# Patient Record
(demographics unavailable — no encounter records)

---

## 2017-04-11 NOTE — ED
General Adult HPI





- General


Source: patient, RN notes reviewed


Mode of arrival: wheelchair


Limitations: no limitations





<Pierre Espino - Last Filed: 04/11/17 19:40>





<Eb Vicente - Last Filed: 04/11/17 22:48>





- General


Chief complaint: Abdominal Pain


Stated complaint: Abdominal pain


Time Seen by Provider: 04/11/17 19:32





- History of Present Illness


Initial comments: 





Patient is a pleasant 54-year-old female presenting to the emergency department 

complaining of abdominal discomfort.  Onset was around 2:00.  Symptoms have 

improved and are now mild.  Patient does have discomfort right side of the 

abdomen with radiation towards the back.  Patient has had similar back pain 

multiple times in the past.  Patient also at times feels like her food is not 

being swallowed appropriately and vomits it up.  Patient having problems with 

swallowing feels discomfort more in the mid to lower abdomen.  No fevers.  No 

food prior to onset of symptoms today. (Pierre Espino)





- Related Data


 Previous Rx's











 Medication  Instructions  Recorded


 


Dicyclomine [Bentyl] 20 mg PO QID #15 tablet 04/11/17


 


Famotidine [Pepcid] 20 mg PO DAILY #14 tablet 04/11/17











 Allergies











Allergy/AdvReac Type Severity Reaction Status Date / Time


 


erythromycin base Allergy  Nausea & Verified 04/11/17 19:43





[Erythromycin Base]   Vomiting &  





   Diarrhea  


 


Penicillins Allergy  Rash/Hives Verified 04/11/17 19:43














Review of Systems


ROS Other: All systems not noted in ROS Statement are negative.


Constitutional: Denies: fever


Eyes: Denies: eye pain


ENT: Denies: ear pain


Respiratory: Denies: cough


Cardiovascular: Denies: chest pain


Endocrine: Denies: fatigue


Gastrointestinal: Reports: abdominal pain.  Denies: vomiting


Genitourinary: Denies: dysuria


Musculoskeletal: Denies: arthralgia


Skin: Denies: rash


Neurological: Denies: weakness





<Pierre Espino - Last Filed: 04/11/17 19:40>


ROS Other: All systems not noted in ROS Statement are negative.





<Eb Vicente - Last Filed: 04/11/17 22:48>


ROS Statement: 


Those systems with pertinent positive or pertinent negative responses have been 

documented in the HPI.








Past Medical History


Past Medical History: No Reported History


History of Any Multi-Drug Resistant Organisms: None Reported


Past Surgical History: Hernia Repair, Tubal Ligation


Past Psychological History: No Psychological Hx Reported


Smoking Status: Never smoker


Past Alcohol Use History: None Reported


Past Drug Use History: None Reported





<Pierre Espino - Last Filed: 04/11/17 19:40>





General Exam


Limitations: no limitations


General appearance: alert, in no apparent distress


Head exam: Present: atraumatic


Eye exam: Present: normal appearance, PERRL


ENT exam: Present: normal oropharynx


Neck exam: Present: normal inspection


Respiratory exam: Present: normal lung sounds bilaterally


Cardiovascular Exam: Present: regular rate, normal rhythm


GI/Abdominal exam: Present: soft, tenderness (Mild tenderness in the epigastric 

and upper quadrant.  Moderate tenderness in the mid and right lower quadrant.), 

normal bowel sounds.  Absent: distended, guarding, rebound, rigid, pulsatile 

mass


Extremities exam: Present: normal inspection.  Absent: pedal edema, calf 

tenderness


Back exam: Present: normal inspection.  Absent: tenderness


Neurological exam: Present: alert


Psychiatric exam: Present: normal affect, normal mood


Skin exam: Absent: rash





<Pierre Espino - Last Filed: 04/11/17 19:40>





Medical Decision Making





- Lab Data


Result diagrams: 


 04/11/17 20:05





 04/11/17 20:05





<Eb Vicente - Last Filed: 04/11/17 22:48>





- Lab Data





 Lab Results











  04/11/17 04/11/17 04/11/17 Range/Units





  20:05 20:05 20:05 


 


WBC   11.0 H   (3.8-10.6)  k/uL


 


RBC   4.46   (3.80-5.40)  m/uL


 


Hgb   14.3   (11.4-16.0)  gm/dL


 


Hct   40.5   (34.0-46.0)  %


 


MCV   90.8   (80.0-100.0)  fL


 


MCH   32.1   (25.0-35.0)  pg


 


MCHC   35.3   (31.0-37.0)  g/dL


 


RDW   12.2   (11.5-15.5)  %


 


Plt Count   349   (150-450)  k/uL


 


Neutrophils %   76   %


 


Lymphocytes %   15   %


 


Monocytes %   5   %


 


Eosinophils %   2   %


 


Basophils %   0   %


 


Neutrophils #   8.4 H   (1.3-7.7)  k/uL


 


Lymphocytes #   1.7   (1.0-4.8)  k/uL


 


Monocytes #   0.5   (0-1.0)  k/uL


 


Eosinophils #   0.2   (0-0.7)  k/uL


 


Basophils #   0.0   (0-0.2)  k/uL


 


PT    10.7  (9.0-12.0)  sec


 


INR    1.1  (<1.1)  


 


APTT    23.9  (22.0-30.0)  sec


 


Sodium  139    (137-145)  mmol/L


 


Potassium  4.1    (3.5-5.1)  mmol/L


 


Chloride  102    ()  mmol/L


 


Carbon Dioxide  27    (22-30)  mmol/L


 


Anion Gap  10    mmol/L


 


BUN  15    (7-17)  mg/dL


 


Creatinine  0.72    (0.52-1.04)  mg/dL


 


Est GFR (MDRD) Af Amer  >60    (>60 ml/min/1.73 sqM)  


 


Est GFR (MDRD) Non-Af  >60    (>60 ml/min/1.73 sqM)  


 


Glucose  103 H    (74-99)  mg/dL


 


Calcium  9.5    (8.4-10.2)  mg/dL


 


Total Bilirubin  0.8    (0.2-1.3)  mg/dL


 


AST  20    (14-36)  U/L


 


ALT  22    (9-52)  U/L


 


Alkaline Phosphatase  58    ()  U/L


 


Total Protein  7.8    (6.3-8.2)  g/dL


 


Albumin  4.3    (3.5-5.0)  g/dL


 


Amylase  62    ()  U/L


 


Lipase  160    ()  U/L


 


Urine Color     


 


Urine Appearance     (Clear)  


 


Urine pH     (5.0-8.0)  


 


Ur Specific Gravity     (1.001-1.035)  


 


Urine Protein     (Negative)  


 


Urine Glucose (UA)     (Negative)  


 


Urine Ketones     (Negative)  


 


Urine Blood     (Negative)  


 


Urine Nitrite     (Negative)  


 


Urine Bilirubin     (Negative)  


 


Urine Urobilinogen     (<2.0)  mg/dL


 


Ur Leukocyte Esterase     (Negative)  


 


Urine WBC     (0-5)  /hpf


 


Ur Squamous Epith Cells     (0-4)  /hpf


 


Amorphous Sediment     (None)  /hpf


 


Urine Mucus     (None)  /hpf














  04/11/17 Range/Units





  20:05 


 


WBC   (3.8-10.6)  k/uL


 


RBC   (3.80-5.40)  m/uL


 


Hgb   (11.4-16.0)  gm/dL


 


Hct   (34.0-46.0)  %


 


MCV   (80.0-100.0)  fL


 


MCH   (25.0-35.0)  pg


 


MCHC   (31.0-37.0)  g/dL


 


RDW   (11.5-15.5)  %


 


Plt Count   (150-450)  k/uL


 


Neutrophils %   %


 


Lymphocytes %   %


 


Monocytes %   %


 


Eosinophils %   %


 


Basophils %   %


 


Neutrophils #   (1.3-7.7)  k/uL


 


Lymphocytes #   (1.0-4.8)  k/uL


 


Monocytes #   (0-1.0)  k/uL


 


Eosinophils #   (0-0.7)  k/uL


 


Basophils #   (0-0.2)  k/uL


 


PT   (9.0-12.0)  sec


 


INR   (<1.1)  


 


APTT   (22.0-30.0)  sec


 


Sodium   (137-145)  mmol/L


 


Potassium   (3.5-5.1)  mmol/L


 


Chloride   ()  mmol/L


 


Carbon Dioxide   (22-30)  mmol/L


 


Anion Gap   mmol/L


 


BUN   (7-17)  mg/dL


 


Creatinine   (0.52-1.04)  mg/dL


 


Est GFR (MDRD) Af Amer   (>60 ml/min/1.73 sqM)  


 


Est GFR (MDRD) Non-Af   (>60 ml/min/1.73 sqM)  


 


Glucose   (74-99)  mg/dL


 


Calcium   (8.4-10.2)  mg/dL


 


Total Bilirubin   (0.2-1.3)  mg/dL


 


AST   (14-36)  U/L


 


ALT   (9-52)  U/L


 


Alkaline Phosphatase   ()  U/L


 


Total Protein   (6.3-8.2)  g/dL


 


Albumin   (3.5-5.0)  g/dL


 


Amylase   ()  U/L


 


Lipase   ()  U/L


 


Urine Color  Yellow  


 


Urine Appearance  Cloudy H  (Clear)  


 


Urine pH  7.5  (5.0-8.0)  


 


Ur Specific Gravity  1.020  (1.001-1.035)  


 


Urine Protein  Trace H  (Negative)  


 


Urine Glucose (UA)  Negative  (Negative)  


 


Urine Ketones  Negative  (Negative)  


 


Urine Blood  Negative  (Negative)  


 


Urine Nitrite  Negative  (Negative)  


 


Urine Bilirubin  Negative  (Negative)  


 


Urine Urobilinogen  <2.0  (<2.0)  mg/dL


 


Ur Leukocyte Esterase  Negative  (Negative)  


 


Urine WBC  1  (0-5)  /hpf


 


Ur Squamous Epith Cells  3  (0-4)  /hpf


 


Amorphous Sediment  Few H  (None)  /hpf


 


Urine Mucus  Occasional H  (None)  /hpf














Disposition





<Pierre Espino - Last Filed: 04/11/17 19:40>





<Eb Vicente - Last Filed: 04/11/17 22:48>


Clinical Impression: 


 Abdominal pain





Disposition: HOME SELF-CARE


Condition: Good


Instructions:  Abdominal Pain (ED)


Prescriptions: 


Dicyclomine [Bentyl] 20 mg PO QID #15 tablet


Famotidine [Pepcid] 20 mg PO DAILY #14 tablet


Referrals: 


Carlos Sherwood MD [Primary Care Provider] - 1-2 days

## 2017-04-11 NOTE — CT
EXAMINATION TYPE: CT ABDOMEN PELVIS WO CON

 

DATE OF EXAM: 4/11/2017 8:22 PM

 

COMPARISON: NONE

 

HISTORY: abdominal pain

 

CT DLP: 570.7 mGycm. Automated exposure control for dose reduction was used.

 

TECHNIQUE:  Helical acquisition of images was performed from the lung bases through the pelvis.

 

FINDINGS: 

LUNG BASES: No significant abnormality is appreciated.

LIVER/GB: No significant abnormality is appreciated.

PANCREAS: No significant abnormality is seen.

SPLEEN: No significant abnormality is seen.

ADRENALS: No significant abnormality is seen.

KIDNEYS: No significant abnormality is seen.

RETROPERITONEAL ADENOPATHY:  None visualized

REPRODUCTIVE ORGANS: No significant abnormality is seen,

URINARY BLADDER:  No significant abnormality is seen.

PELVIC ADENOPATHY:  None visualized.

OSSEOUS STRUCTURES:  No significant abnormality is seen.

BOWEL:  No significant abnormality is seen.

 

Limitation: Without intravenous contrast there is limited sensitivity for focal visceral lesions and 
for intravascular pathology.

 

IMPRESSION: 

NO ACUTE PROCESS.

## 2019-07-31 NOTE — ED
Headache HPI





- General


Chief Complaint: Headache


Stated Complaint: Back and neck pain


Time Seen by Provider: 07/31/19 11:02


Mode of arrival: ambulatory


Limitations: no limitations





- History of Present Illness


Initial Comments: 





Patient is a 56-year-old female presenting to the emergency Department with 

complaints of a headache at times one day.  Patient states yesterday she was 

watching TV and started having "zigzag patterns in her vision" that lasted for 

about 10 minutes and then went away.  Patient states she then went to bed and 

woke up with a headache and a sore neck.  Patient describes her headache as in 

the posterior aspect, 4/10, and describes it as pressure feeling.  Patient 

states she has no increase or decrease in pain with neck movement.  Patient 

denies any recent fever, chills or other recent illnesses.  Patient states she 

has no changes in her vision today.  Patient was diagnosed with fibromyalgia a 

few years ago but does not take any medications.  Patient denies nausea, 

vomiting, abdominal pain.  No other complaints at this time.  Of note, patient 

does admit to being on a bus last week that could be contributing to her sore 

neck.





- Related Data


                                Home Medications











 Medication  Instructions  Recorded  Confirmed


 


Aspirin EC [Ecotrin Low Dose] 324 mg PO DAILY PRN 07/31/19 07/31/19


 


Lysine 500 mg PO DAILY 07/31/19 07/31/19


 


Multivitamins, Thera [Multivitamin 1 tab PO DAILY 07/31/19 07/31/19





(formulary)]   


 


Turmeric Root Extract [Turmeric] 500 mg PO DAILY 07/31/19 07/31/19











                                    Allergies











Allergy/AdvReac Type Severity Reaction Status Date / Time


 


erythromycin base Allergy  Nausea & Verified 07/31/19 11:15





[Erythromycin Base]   Vomiting &  





   Diarrhea  


 


Penicillins Allergy  Rash/Hives Verified 07/31/19 11:15














Review of Systems


ROS Statement: 


Those systems with pertinent positive or pertinent negative responses have been 

documented in the HPI.





ROS Other: All systems not noted in ROS Statement are negative.





Past Medical History


Past Medical History: No Reported History


History of Any Multi-Drug Resistant Organisms: None Reported


Past Surgical History: Hernia Repair, Tubal Ligation


Past Psychological History: No Psychological Hx Reported


Smoking Status: Never smoker


Past Alcohol Use History: None Reported


Past Drug Use History: None Reported





General Exam





- General Exam Comments


Initial Comments: 





GENERAL: Well-appearing, well-nourished and in no acute distress.


HEAD: Atraumatic, normocephalic.


EYES: Pupils equal round and reactive to light, extraocular movements intact, 

sclera anicteric, conjunctiva are normal.


ENT: TMs normal, nares patent, oropharynx clear without exudates.  Moist mucous 

membranes.


NECK: Normal range of motion with soreness at the end range, supple without 

lymphadenopathy or JVD.  No pain with palpation of the posterior paraspinals.


LUNGS: Breath sounds clear to auscultation bilaterally and equal.  No wheezes 

rales or rhonchi.


HEART: Regular rate and rhythm without murmurs, rubs or gallops.


ABDOMEN: Soft, nontender, normoactive bowel sounds.  No guarding, no rebound.  

No masses appreciated.


: Deferred 


EXTREMITIES: Normal range of motion, no pitting or edema.  No clubbing or 

cyanosis.


NEUROLOGICAL: Cranial nerves II through XII grossly intact.  Normal speech, 

normal gait.  sensation equal in bilateral upper and lower extremities.  

Strength 5 out of 5 upper and lower extremities.  


PSYCH: Normal mood, normal affect.


SKIN: Warm, Dry, normal turgor, no rashes or lesions noted.


Limitations: no limitations





Course


                                   Vital Signs











  07/31/19 07/31/19





  10:55 13:24


 


Temperature 98.1 F 98.1 F


 


Pulse Rate 76 70


 


Respiratory 16 16





Rate  


 


Blood Pressure 167/68 145/61


 


O2 Sat by Pulse 97 98





Oximetry  














Medical Decision Making





- Medical Decision Making





Patient is a 56-year-old female presenting with a headache 1 day.  Patient has 

history of fibromyalgia but does not take any medication for this.  Patient's 

exam is unremarkable including neuro exam.  Patient has no meningeal signs.  

Patient states she was on a bus last week that could be contributing to her sore

neck.  Patient was given fluids and Toradol which did relieve her headache.  

Patient's vital signs remained stable during stay, afebrile.  Patient's CBC CMP 

and UA are all within normal limits.  Patient be discharged home and will follow

up with PCP and/or neurologist if symptoms continue and patient was in agreement

with this plan.  Return parameters were discussed with the patient she sophie

balized understanding such as fever, chills, increased pain in the neck or head.

 Case discussed with Dr. Block.





- Lab Data


Result diagrams: 


                                 07/31/19 11:45





                                 07/31/19 11:45


                                   Lab Results











  07/31/19 07/31/19 07/31/19 Range/Units





  11:45 11:45 11:45 


 


WBC   7.6   (3.8-10.6)  k/uL


 


RBC   4.83   (3.80-5.40)  m/uL


 


Hgb   14.9   (11.4-16.0)  gm/dL


 


Hct   44.4   (34.0-46.0)  %


 


MCV   92.0   (80.0-100.0)  fL


 


MCH   30.9   (25.0-35.0)  pg


 


MCHC   33.6   (31.0-37.0)  g/dL


 


RDW   13.1   (11.5-15.5)  %


 


Plt Count   318   (150-450)  k/uL


 


Neutrophils %   58   %


 


Lymphocytes %   28   %


 


Monocytes %   6   %


 


Eosinophils %   6   %


 


Basophils %   1   %


 


Neutrophils #   4.4   (1.3-7.7)  k/uL


 


Lymphocytes #   2.1   (1.0-4.8)  k/uL


 


Monocytes #   0.4   (0-1.0)  k/uL


 


Eosinophils #   0.5   (0-0.7)  k/uL


 


Basophils #   0.1   (0-0.2)  k/uL


 


Sodium    138  (137-145)  mmol/L


 


Potassium    4.7  (3.5-5.1)  mmol/L


 


Chloride    104  ()  mmol/L


 


Carbon Dioxide    28  (22-30)  mmol/L


 


Anion Gap    6  mmol/L


 


BUN    15  (7-17)  mg/dL


 


Creatinine    0.73  (0.52-1.04)  mg/dL


 


Est GFR (CKD-EPI)AfAm    >90  (>60 ml/min/1.73 sqM)  


 


Est GFR (CKD-EPI)NonAf    >90  (>60 ml/min/1.73 sqM)  


 


Glucose    100 H  (74-99)  mg/dL


 


Calcium    9.7  (8.4-10.2)  mg/dL


 


Total Bilirubin    1.3  (0.2-1.3)  mg/dL


 


AST    25  (14-36)  U/L


 


ALT    17  (9-52)  U/L


 


Alkaline Phosphatase    62  ()  U/L


 


Total Protein    8.1  (6.3-8.2)  g/dL


 


Albumin    4.5  (3.5-5.0)  g/dL


 


Urine Color  Yellow    


 


Urine Appearance  Clear    (Clear)  


 


Urine pH  6.0    (5.0-8.0)  


 


Ur Specific Gravity  1.020    (1.001-1.035)  


 


Urine Protein  Trace H    (Negative)  


 


Urine Glucose (UA)  Negative    (Negative)  


 


Urine Ketones  Negative    (Negative)  


 


Urine Blood  Trace H    (Negative)  


 


Urine Nitrite  Negative    (Negative)  


 


Urine Bilirubin  Negative    (Negative)  


 


Urine Urobilinogen  <2.0    (<2.0)  mg/dL


 


Ur Leukocyte Esterase  Small H    (Negative)  


 


Urine RBC  4    (0-5)  /hpf


 


Urine WBC  2    (0-5)  /hpf


 


Ur Squamous Epith Cells  1    (0-4)  /hpf


 


Urine Mucus  Rare H    (None)  /hpf














Disposition


Clinical Impression: 


 Headache





Disposition: HOME SELF-CARE


Condition: Stable


Instructions (If sedation given, give patient instructions):  Acute Headache 

(ED)


Additional Instructions: 


Please return to the Emergency Department if symptoms worsen or any other 

concerns.


Is patient prescribed a controlled substance at d/c from ED?: No


Referrals: 


Carlos Sherwood MD [Primary Care Provider] - 1-2 days